# Patient Record
Sex: FEMALE | Race: WHITE | NOT HISPANIC OR LATINO | Employment: OTHER | URBAN - METROPOLITAN AREA
[De-identification: names, ages, dates, MRNs, and addresses within clinical notes are randomized per-mention and may not be internally consistent; named-entity substitution may affect disease eponyms.]

---

## 2017-11-15 ENCOUNTER — ESTABLISHED COMPREHENSIVE EXAM (OUTPATIENT)
Dept: URBAN - METROPOLITAN AREA CLINIC 43 | Facility: CLINIC | Age: 72
End: 2017-11-15

## 2017-11-15 VITALS — SYSTOLIC BLOOD PRESSURE: 126 MMHG | DIASTOLIC BLOOD PRESSURE: 82 MMHG | HEIGHT: 55 IN | HEART RATE: 74 BPM

## 2017-11-15 DIAGNOSIS — H04.123: ICD-10-CM

## 2017-11-15 DIAGNOSIS — H02.833: ICD-10-CM

## 2017-11-15 DIAGNOSIS — H00.023: ICD-10-CM

## 2017-11-15 DIAGNOSIS — H25.811: ICD-10-CM

## 2017-11-15 DIAGNOSIS — H02.836: ICD-10-CM

## 2017-11-15 DIAGNOSIS — H25.812: ICD-10-CM

## 2017-11-15 DIAGNOSIS — H00.026: ICD-10-CM

## 2017-11-15 PROCEDURE — 92015 DETERMINE REFRACTIVE STATE: CPT

## 2017-11-15 PROCEDURE — 92014 COMPRE OPH EXAM EST PT 1/>: CPT

## 2017-11-15 ASSESSMENT — VISUAL ACUITY
OU_CC: 20/20-2
OU_CC: J1
OS_BAT: 20/50
OU_SC: 20/70-1
OS_CC: J1
OD_BAT: 20/50
OD_CC: 20/30+2
OD_SC: 20/80-2
OD_CC: J1
OU_SC: J5
OD_SC: J7
OS_SC: J8
OS_SC: 20/80-2
OS_CC: 20/25-2

## 2017-11-15 ASSESSMENT — TONOMETRY
OS_IOP_MMHG: 18
OD_IOP_MMHG: 17

## 2018-06-15 ENCOUNTER — EST. PATIENT EMERGENCY (OUTPATIENT)
Dept: URBAN - METROPOLITAN AREA CLINIC 43 | Facility: CLINIC | Age: 73
End: 2018-06-15

## 2018-06-15 DIAGNOSIS — H02.833: ICD-10-CM

## 2018-06-15 DIAGNOSIS — H02.836: ICD-10-CM

## 2018-06-15 DIAGNOSIS — H04.123: ICD-10-CM

## 2018-06-15 DIAGNOSIS — H00.026: ICD-10-CM

## 2018-06-15 DIAGNOSIS — H00.023: ICD-10-CM

## 2018-06-15 PROCEDURE — 92012 INTRM OPH EXAM EST PATIENT: CPT

## 2018-06-15 ASSESSMENT — VISUAL ACUITY
OS_CC: 20/25
OD_CC: 20/30-1

## 2018-11-21 ENCOUNTER — ESTABLISHED COMPREHENSIVE EXAM (OUTPATIENT)
Dept: URBAN - METROPOLITAN AREA CLINIC 43 | Facility: CLINIC | Age: 73
End: 2018-11-21

## 2018-11-21 DIAGNOSIS — H04.123: ICD-10-CM

## 2018-11-21 DIAGNOSIS — H02.886: ICD-10-CM

## 2018-11-21 DIAGNOSIS — H01.003: ICD-10-CM

## 2018-11-21 DIAGNOSIS — H25.811: ICD-10-CM

## 2018-11-21 DIAGNOSIS — H02.883: ICD-10-CM

## 2018-11-21 DIAGNOSIS — H25.812: ICD-10-CM

## 2018-11-21 DIAGNOSIS — H01.006: ICD-10-CM

## 2018-11-21 PROCEDURE — 92014 COMPRE OPH EXAM EST PT 1/>: CPT

## 2018-11-21 PROCEDURE — 92015 DETERMINE REFRACTIVE STATE: CPT

## 2018-11-21 RX ORDER — ERYTHROMYCIN 5 MG/G: OINTMENT OPHTHALMIC

## 2018-11-21 ASSESSMENT — VISUAL ACUITY
OS_CC: 20/25-1
OD_BAT: 20/400
OU_CC: J1
OD_SC: J12
OS_CC: J1
OD_SC: 20/70-1
OS_SC: J12
OS_BAT: 20/400
OD_CC: 20/25-1
OD_CC: J1
OS_SC: 20/70-1

## 2018-11-21 ASSESSMENT — TONOMETRY
OD_IOP_MMHG: 11
OS_IOP_MMHG: 11

## 2019-06-27 NOTE — PATIENT DISCUSSION
Continue: Besivance (besifloxacin): drops,suspension: 0.6% 1 drop four times a day as directed into left eye

## 2019-06-27 NOTE — PATIENT DISCUSSION
New Prescription: Besivance (besifloxacin): drops,suspension: 0.6% 1 drop four times a day as directed into left eye 06-

## 2019-11-20 ENCOUNTER — ESTABLISHED COMPREHENSIVE EXAM (OUTPATIENT)
Dept: URBAN - METROPOLITAN AREA CLINIC 43 | Facility: CLINIC | Age: 74
End: 2019-11-20

## 2019-11-20 DIAGNOSIS — H04.123: ICD-10-CM

## 2019-11-20 DIAGNOSIS — H01.003: ICD-10-CM

## 2019-11-20 DIAGNOSIS — H25.811: ICD-10-CM

## 2019-11-20 DIAGNOSIS — H02.886: ICD-10-CM

## 2019-11-20 DIAGNOSIS — H25.812: ICD-10-CM

## 2019-11-20 PROCEDURE — 92015 DETERMINE REFRACTIVE STATE: CPT

## 2019-11-20 PROCEDURE — 92014 COMPRE OPH EXAM EST PT 1/>: CPT

## 2019-11-20 ASSESSMENT — TONOMETRY
OD_IOP_MMHG: 12
OS_IOP_MMHG: 12

## 2019-11-20 ASSESSMENT — VISUAL ACUITY
OD_SC: 20/70-1
OS_SC: J12
OS_SC: 20/60-2
OD_CC: J1
OD_CC: 20/20-2
OD_SC: J12
OS_CC: J1
OD_BAT: 20/80
OS_CC: 20/20-2
OS_BAT: 20/80

## 2020-11-18 ENCOUNTER — ESTABLISHED COMPREHENSIVE EXAM (OUTPATIENT)
Dept: URBAN - METROPOLITAN AREA CLINIC 43 | Facility: CLINIC | Age: 75
End: 2020-11-18

## 2020-11-18 DIAGNOSIS — H04.123: ICD-10-CM

## 2020-11-18 DIAGNOSIS — H02.883: ICD-10-CM

## 2020-11-18 DIAGNOSIS — H25.811: ICD-10-CM

## 2020-11-18 DIAGNOSIS — H25.812: ICD-10-CM

## 2020-11-18 DIAGNOSIS — H02.886: ICD-10-CM

## 2020-11-18 PROCEDURE — 92014 COMPRE OPH EXAM EST PT 1/>: CPT

## 2020-11-18 PROCEDURE — 92015 DETERMINE REFRACTIVE STATE: CPT

## 2020-11-18 ASSESSMENT — VISUAL ACUITY
OS_SC: J10
OD_CC: 20/20-2
OS_CC: J1
OD_SC: 20/70-1
OD_CC: J1
OS_CC: 20/25+2
OS_SC: 20/80-1
OD_SC: J10

## 2020-11-18 ASSESSMENT — TONOMETRY
OD_IOP_MMHG: 15
OS_IOP_MMHG: 15

## 2021-04-30 NOTE — PATIENT DISCUSSION
- Discussed the r/b/a of cataract surgery. Patient is interested and will consider.  Will likely observe for now

## 2021-11-18 ENCOUNTER — ESTABLISHED COMPREHENSIVE EXAM (OUTPATIENT)
Dept: URBAN - METROPOLITAN AREA CLINIC 43 | Facility: CLINIC | Age: 76
End: 2021-11-18

## 2021-11-18 DIAGNOSIS — H25.812: ICD-10-CM

## 2021-11-18 DIAGNOSIS — H02.883: ICD-10-CM

## 2021-11-18 DIAGNOSIS — H02.886: ICD-10-CM

## 2021-11-18 DIAGNOSIS — H04.123: ICD-10-CM

## 2021-11-18 DIAGNOSIS — H25.811: ICD-10-CM

## 2021-11-18 PROCEDURE — 92014 COMPRE OPH EXAM EST PT 1/>: CPT

## 2021-11-18 PROCEDURE — 92015 DETERMINE REFRACTIVE STATE: CPT

## 2021-11-18 ASSESSMENT — VISUAL ACUITY
OS_SC: 20/100-2
OD_CC: J1
OS_CC: J1
OS_CC: 20/30-2+2
OD_CC: 20/30-1+2
OD_SC: 20/200+1
OD_SC: J10
OS_SC: J8

## 2021-11-18 ASSESSMENT — TONOMETRY
OD_IOP_MMHG: 16
OS_IOP_MMHG: 16

## 2022-10-21 ENCOUNTER — EMERGENCY VISIT (OUTPATIENT)
Dept: URBAN - METROPOLITAN AREA CLINIC 43 | Facility: CLINIC | Age: 77
End: 2022-10-21

## 2022-10-21 DIAGNOSIS — H53.8: ICD-10-CM

## 2022-10-21 DIAGNOSIS — H02.883: ICD-10-CM

## 2022-10-21 DIAGNOSIS — H01.003: ICD-10-CM

## 2022-10-21 DIAGNOSIS — H04.123: ICD-10-CM

## 2022-10-21 DIAGNOSIS — H02.886: ICD-10-CM

## 2022-10-21 DIAGNOSIS — H43.812: ICD-10-CM

## 2022-10-21 DIAGNOSIS — H25.813: ICD-10-CM

## 2022-10-21 DIAGNOSIS — H01.006: ICD-10-CM

## 2022-10-21 PROCEDURE — 92012 INTRM OPH EXAM EST PATIENT: CPT

## 2022-10-21 PROCEDURE — 92250 FUNDUS PHOTOGRAPHY W/I&R: CPT

## 2022-10-21 ASSESSMENT — TONOMETRY
OD_IOP_MMHG: 15
OS_IOP_MMHG: 14

## 2022-10-21 ASSESSMENT — VISUAL ACUITY
OD_CC: 20/30+2
OS_CC: 20/30+1

## 2022-11-18 ENCOUNTER — COMPREHENSIVE EXAM (OUTPATIENT)
Dept: URBAN - METROPOLITAN AREA CLINIC 43 | Facility: CLINIC | Age: 77
End: 2022-11-18

## 2022-11-18 DIAGNOSIS — H04.123: ICD-10-CM

## 2022-11-18 DIAGNOSIS — H43.812: ICD-10-CM

## 2022-11-18 DIAGNOSIS — H01.003: ICD-10-CM

## 2022-11-18 DIAGNOSIS — H01.006: ICD-10-CM

## 2022-11-18 DIAGNOSIS — H02.886: ICD-10-CM

## 2022-11-18 DIAGNOSIS — H02.883: ICD-10-CM

## 2022-11-18 DIAGNOSIS — H25.813: ICD-10-CM

## 2022-11-18 PROCEDURE — 92015 DETERMINE REFRACTIVE STATE: CPT

## 2022-11-18 PROCEDURE — 92014 COMPRE OPH EXAM EST PT 1/>: CPT

## 2022-11-18 ASSESSMENT — VISUAL ACUITY
OS_CC: J2
OD_SC: 20/70
OD_CC: J1
OS_SC: 20/70-1
OS_CC: 20/20-1
OD_CC: 20/20-1
OD_SC: J12
OS_SC: J12

## 2022-11-18 ASSESSMENT — TONOMETRY
OD_IOP_MMHG: 16
OS_IOP_MMHG: 16

## 2023-11-27 ENCOUNTER — COMPREHENSIVE EXAM (OUTPATIENT)
Dept: URBAN - METROPOLITAN AREA CLINIC 43 | Facility: CLINIC | Age: 78
End: 2023-11-27

## 2023-11-27 DIAGNOSIS — H01.006: ICD-10-CM

## 2023-11-27 DIAGNOSIS — H01.003: ICD-10-CM

## 2023-11-27 DIAGNOSIS — H02.886: ICD-10-CM

## 2023-11-27 DIAGNOSIS — H02.883: ICD-10-CM

## 2023-11-27 DIAGNOSIS — H25.813: ICD-10-CM

## 2023-11-27 DIAGNOSIS — H04.123: ICD-10-CM

## 2023-11-27 DIAGNOSIS — H43.812: ICD-10-CM

## 2023-11-27 PROCEDURE — 92015 DETERMINE REFRACTIVE STATE: CPT

## 2023-11-27 PROCEDURE — 92014 COMPRE OPH EXAM EST PT 1/>: CPT

## 2023-11-27 ASSESSMENT — VISUAL ACUITY
OS_CC: 20/30-1
OD_CC: 20/25
OS_BAT: 20/50
OS_CC: J3
OS_SC: J12
OD_SC: 20/70
OD_CC: J2
OD_SC: J12
OS_SC: 20/70-1
OD_BAT: 20/40

## 2023-11-27 ASSESSMENT — TONOMETRY
OD_IOP_MMHG: 15
OS_IOP_MMHG: 16

## 2024-07-24 ENCOUNTER — EMERGENCY VISIT (OUTPATIENT)
Dept: URBAN - METROPOLITAN AREA CLINIC 43 | Facility: CLINIC | Age: 79
End: 2024-07-24

## 2024-07-24 DIAGNOSIS — H02.883: ICD-10-CM

## 2024-07-24 DIAGNOSIS — H02.886: ICD-10-CM

## 2024-07-24 DIAGNOSIS — H01.003: ICD-10-CM

## 2024-07-24 DIAGNOSIS — H04.123: ICD-10-CM

## 2024-07-24 DIAGNOSIS — H01.006: ICD-10-CM

## 2024-07-24 PROCEDURE — 92012 INTRM OPH EXAM EST PATIENT: CPT

## 2024-07-24 ASSESSMENT — TONOMETRY
OS_IOP_MMHG: 14
OD_IOP_MMHG: 14

## 2024-07-24 ASSESSMENT — VISUAL ACUITY
OD_CC: 20/25-1
OS_CC: 20/30-2

## 2024-12-02 ENCOUNTER — COMPREHENSIVE EXAM (OUTPATIENT)
Age: 79
End: 2024-12-02

## 2024-12-02 DIAGNOSIS — H43.812: ICD-10-CM

## 2024-12-02 DIAGNOSIS — H25.813: ICD-10-CM

## 2024-12-02 DIAGNOSIS — H01.006: ICD-10-CM

## 2024-12-02 DIAGNOSIS — H01.003: ICD-10-CM

## 2024-12-02 DIAGNOSIS — H02.883: ICD-10-CM

## 2024-12-02 DIAGNOSIS — H04.123: ICD-10-CM

## 2024-12-02 DIAGNOSIS — H02.886: ICD-10-CM

## 2024-12-02 PROCEDURE — 92014 COMPRE OPH EXAM EST PT 1/>: CPT

## 2024-12-02 PROCEDURE — 92015 DETERMINE REFRACTIVE STATE: CPT

## 2025-04-30 ENCOUNTER — CONSULTATION/EVALUATION (OUTPATIENT)
Age: 80
End: 2025-04-30

## 2025-04-30 DIAGNOSIS — H01.00A: ICD-10-CM

## 2025-04-30 DIAGNOSIS — H25.813: ICD-10-CM

## 2025-04-30 DIAGNOSIS — H01.006: ICD-10-CM

## 2025-04-30 DIAGNOSIS — H04.123: ICD-10-CM

## 2025-04-30 DIAGNOSIS — H01.003: ICD-10-CM

## 2025-04-30 DIAGNOSIS — H01.00B: ICD-10-CM

## 2025-04-30 DIAGNOSIS — H43.812: ICD-10-CM

## 2025-04-30 DIAGNOSIS — B88.0: ICD-10-CM

## 2025-04-30 DIAGNOSIS — H18.513: ICD-10-CM

## 2025-04-30 PROCEDURE — 92025-3 CORNEAL TOPO, REFUSED

## 2025-04-30 PROCEDURE — 99214 OFFICE O/P EST MOD 30 MIN: CPT

## 2025-04-30 PROCEDURE — 92286 ANT SGM IMG I&R SPECLR MIC: CPT

## 2025-04-30 PROCEDURE — 92136 OPHTHALMIC BIOMETRY: CPT

## 2025-04-30 PROCEDURE — 92134 CPTRZ OPH DX IMG PST SGM RTA: CPT | Mod: NC

## 2025-04-30 RX ORDER — KETOROLAC TROMETHAMINE 5 MG/ML: 1 SOLUTION OPHTHALMIC

## 2025-04-30 RX ORDER — LOTILANER OPHTHALMIC SOLUTION 2.5 MG/ML: 1 SOLUTION/ DROPS OPHTHALMIC TWICE A DAY

## 2025-04-30 RX ORDER — MOXIFLOXACIN OPHTHALMIC 5 MG/ML: 1 SOLUTION/ DROPS OPHTHALMIC

## 2025-04-30 RX ORDER — PREDNISOLONE ACETATE 10 MG/ML: 1 SUSPENSION/ DROPS OPHTHALMIC

## 2025-05-28 ENCOUNTER — SURGERY/PROCEDURE (OUTPATIENT)
Age: 80
End: 2025-05-28

## 2025-05-28 ENCOUNTER — PRE-OP/H&P (OUTPATIENT)
Age: 80
End: 2025-05-28

## 2025-05-28 DIAGNOSIS — H25.813: ICD-10-CM

## 2025-05-28 DIAGNOSIS — H43.812: ICD-10-CM

## 2025-05-28 DIAGNOSIS — H01.006: ICD-10-CM

## 2025-05-28 DIAGNOSIS — H04.123: ICD-10-CM

## 2025-05-28 DIAGNOSIS — H18.513: ICD-10-CM

## 2025-05-28 DIAGNOSIS — H01.00A: ICD-10-CM

## 2025-05-28 DIAGNOSIS — B88.0: ICD-10-CM

## 2025-05-28 DIAGNOSIS — H01.003: ICD-10-CM

## 2025-05-28 DIAGNOSIS — H01.00B: ICD-10-CM

## 2025-05-28 PROCEDURE — 99211HP PRE-OP

## 2025-05-28 PROCEDURE — 66984 XCAPSL CTRC RMVL W/O ECP: CPT

## 2025-05-29 ENCOUNTER — POST-OP (OUTPATIENT)
Age: 80
End: 2025-05-29

## 2025-05-29 DIAGNOSIS — H01.00A: ICD-10-CM

## 2025-05-29 DIAGNOSIS — H01.00B: ICD-10-CM

## 2025-05-29 DIAGNOSIS — H18.513: ICD-10-CM

## 2025-05-29 DIAGNOSIS — H01.006: ICD-10-CM

## 2025-05-29 DIAGNOSIS — H43.812: ICD-10-CM

## 2025-05-29 DIAGNOSIS — Z96.1: ICD-10-CM

## 2025-05-29 DIAGNOSIS — B88.0: ICD-10-CM

## 2025-05-29 DIAGNOSIS — H02.883: ICD-10-CM

## 2025-05-29 DIAGNOSIS — H01.003: ICD-10-CM

## 2025-05-29 DIAGNOSIS — H25.812: ICD-10-CM

## 2025-05-29 DIAGNOSIS — H04.123: ICD-10-CM

## 2025-05-29 DIAGNOSIS — H02.886: ICD-10-CM

## 2025-05-29 PROCEDURE — 99024 POSTOP FOLLOW-UP VISIT: CPT

## 2025-06-18 ENCOUNTER — PRE-OP/H&P (OUTPATIENT)
Age: 80
End: 2025-06-18

## 2025-06-18 ENCOUNTER — SURGERY/PROCEDURE (OUTPATIENT)
Age: 80
End: 2025-06-18

## 2025-06-18 DIAGNOSIS — H01.003: ICD-10-CM

## 2025-06-18 DIAGNOSIS — H25.813: ICD-10-CM

## 2025-06-18 DIAGNOSIS — H25.812: ICD-10-CM

## 2025-06-18 DIAGNOSIS — H01.006: ICD-10-CM

## 2025-06-18 PROCEDURE — 99211HP PRE-OP

## 2025-06-18 PROCEDURE — 66984 XCAPSL CTRC RMVL W/O ECP: CPT | Mod: 79,LT

## 2025-06-19 ENCOUNTER — POST-OP (OUTPATIENT)
Age: 80
End: 2025-06-19

## 2025-06-19 DIAGNOSIS — Z96.1: ICD-10-CM

## 2025-06-19 PROCEDURE — 99024 POSTOP FOLLOW-UP VISIT: CPT

## 2025-07-10 ENCOUNTER — POST-OP (OUTPATIENT)
Age: 80
End: 2025-07-10

## 2025-07-10 DIAGNOSIS — Z96.1: ICD-10-CM

## 2025-07-10 PROCEDURE — 99024 POSTOP FOLLOW-UP VISIT: CPT
